# Patient Record
Sex: FEMALE | Race: WHITE | NOT HISPANIC OR LATINO | Employment: PART TIME | URBAN - METROPOLITAN AREA
[De-identification: names, ages, dates, MRNs, and addresses within clinical notes are randomized per-mention and may not be internally consistent; named-entity substitution may affect disease eponyms.]

---

## 2013-10-30 LAB — HM MAMMOGRAM: NORMAL

## 2013-11-05 LAB — HM PAP SMEAR: NORMAL

## 2017-02-02 ENCOUNTER — OFFICE VISIT (OUTPATIENT)
Dept: OBSTETRICS AND GYNECOLOGY | Age: 47
End: 2017-02-02

## 2017-02-02 VITALS
DIASTOLIC BLOOD PRESSURE: 70 MMHG | HEIGHT: 64 IN | WEIGHT: 188 LBS | SYSTOLIC BLOOD PRESSURE: 110 MMHG | BODY MASS INDEX: 32.1 KG/M2

## 2017-02-02 DIAGNOSIS — Z01.419 ENCOUNTER FOR GYNECOLOGICAL EXAMINATION: Primary | ICD-10-CM

## 2017-02-02 DIAGNOSIS — N95.1 PERIMENOPAUSE: ICD-10-CM

## 2017-02-02 DIAGNOSIS — F32.A DEPRESSION, UNSPECIFIED DEPRESSION TYPE: ICD-10-CM

## 2017-02-02 PROCEDURE — 99396 PREV VISIT EST AGE 40-64: CPT | Performed by: NURSE PRACTITIONER

## 2017-02-02 RX ORDER — RISPERIDONE 2 MG/1
TABLET ORAL
Refills: 1 | COMMUNITY
Start: 2017-01-23 | End: 2018-04-30 | Stop reason: DRUGHIGH

## 2017-02-02 RX ORDER — NORELGESTROMIN AND ETHINYL ESTRADIOL 150; 35 UG/D; UG/D
PATCH TRANSDERMAL
Refills: 0 | COMMUNITY
Start: 2017-01-13 | End: 2017-02-02

## 2017-02-02 RX ORDER — BUSPIRONE HYDROCHLORIDE 15 MG/1
TABLET ORAL
Refills: 0 | COMMUNITY
Start: 2017-01-05 | End: 2018-03-05 | Stop reason: SDUPTHER

## 2017-02-02 NOTE — PROGRESS NOTES
"Subjective     Chief Complaint   Patient presents with   • Gynecologic Exam     Pt also wants hormones checked. Last ae .       History of Present Illness    Yessy Mchugh is a 46 y.o. female who presents for annual exam.  Patient here with complaints of increase in her depression symptoms over the past 6 months.  She is seeing a psychiatrist and therapist and they are working on her depression meds but she has now realized that her symptoms are specifically before and during her cycle.  She always has depression and anxiety but it is significantly worse during this time.  She has been seeing her PCP and he started her on xulane which she has used for the past two weeks.  She has not had a cycle since starting it so she isn't sure if it has helped at all.  She is here to follow up and make sure that is what we would suggest.  She has taken birth control in the past and has no new medical changes or complaints.  We discussed her cycles and symptoms in detail as well as s/s of menopause.  Her cycles are very regular and she denies any vasomotor symptoms.  No bowel or bladder problems.  She has not been seen in our office for a few years and has not had a mammogram during that time as well. She has had a tubal.  She currently denies any suicidal ideations but she has had them in the recent past.  She has had a full work up from PCP and has labs done that were \"normal\".    Obstetric History:  OB History      Para Term  AB TAB SAB Ectopic Multiple Living    3 3 3                Menstrual History:     Patient's last menstrual period was 2017.       Sexual History:   + no problems        Her menses are regular every 28-30 days, lasting 4-7 days.      History of abnormal Pap smear: no  Received Gardasil immunization: no  Perform regular self breast exam: yes - occasional  Family history of uterine or ovarian cancer: no  Family History of colon cancer: no  Family history of breast cancer: " no    Mammogram: ordered.  Colonoscopy: not indicated.  DEXA: not indicated.  Last Pap:    Smoking status: Former Smoker                                                              Packs/day: 0.00      Years: 0.00         Smokeless status: Not on file                       Exercise: not active.  Discussed  Calcium/Vitamin D: Discussed adding supplements    The following portions of the patient's history were reviewed and updated as appropriate: allergies, current medications, past family history, past medical history, past social history, past surgical history and problem list.    Review of Systems   Constitutional: Negative.    HENT: Negative.    Eyes: Negative.    Respiratory: Negative.    Cardiovascular: Negative.    Gastrointestinal: Negative.    Endocrine: Negative.    Genitourinary: Negative.    Musculoskeletal: Negative.    Skin: Negative.    Allergic/Immunologic: Negative.    Neurological: Negative.    Hematological: Negative.    Psychiatric/Behavioral: Positive for decreased concentration. Negative for suicidal ideas. The patient is nervous/anxious.            Objective   Physical Exam   Constitutional: She is oriented to person, place, and time. She appears well-developed and well-nourished.   Neck: No thyroid mass present.   Cardiovascular: Normal rate, regular rhythm and normal heart sounds.    Pulmonary/Chest: Effort normal and breath sounds normal. Right breast exhibits no mass, no nipple discharge, no skin change and no tenderness. Left breast exhibits no mass, no nipple discharge, no skin change and no tenderness.   Abdominal: Soft. There is no tenderness.   Genitourinary: Vagina normal and uterus normal. There is no rash or lesion on the right labia. Cervix exhibits no motion tenderness, no discharge and no friability. Right adnexum displays no mass and no tenderness. Left adnexum displays no mass and no tenderness.   Neurological: She is alert and oriented to person, place, and time.  "  Psychiatric: She has a normal mood and affect. Her behavior is normal.   Vitals reviewed.      Visit Vitals   • /70   • Ht 64\" (162.6 cm)   • Wt 188 lb (85.3 kg)   • LMP 01/16/2017   • BMI 32.27 kg/m2       Assessment/Plan   Yessy was seen today for gynecologic exam.    Diagnoses and all orders for this visit:    Encounter for gynecological examination    Perimenopause    Depression, unspecified depression type        Breast self exam technique reviewed and patient encouraged to perform self-exam monthly.  Discussed healthy lifestyle modifications.  Pap smear done today     Discussed stopping xulane.  This would not be my first choice for someone her age.  We discussed starting with a lower dose pill such as Loloestrin.  Discussed and sample given.  She will stop if her depression worsens with it.  Otherwise I would start it with her next cycle and follow up in 3 months.  The use of the oral contraceptive has been fully discussed with the patient. This includes the proper method to initiate (i.e. Sunday start after next normal menstrual onset) and continue the pills, the need for regular compliance to ensure adequate contraceptive effect, the physiology which make the pill effective, the instructions for what to do in event of a missed pill, and warnings about anticipated minor side effects such as breakthrough spotting, nausea, breast tenderness, weight changes, acne, headaches, etc.  She has been told of the more serious potential side effects such as MI, stroke, and deep vein thrombosis, all of which are very unlikely.  She has been asked to report any signs of such serious problems immediately.  She should back up the pill with a condom during any cycle in which antibiotics are prescribed, and during the first cycle as well. The need for additional protection, such as a condom, to prevent exposure to sexually transmitted diseases has also been discussed- the patient has been clearly reminded that OCP's " cannot protect her against diseases such as HIV and others. She understands and wishes to take the medication as prescribed.

## 2017-02-06 DIAGNOSIS — R92.1 BREAST CALCIFICATION SEEN ON MAMMOGRAM: Primary | ICD-10-CM

## 2017-02-07 DIAGNOSIS — R92.8 ABNORMAL MAMMOGRAM: Primary | ICD-10-CM

## 2017-02-07 LAB
CYTOLOGIST CVX/VAG CYTO: NORMAL
CYTOLOGY CVX/VAG DOC THIN PREP: NORMAL
DX ICD CODE: NORMAL
HIV 1 & 2 AB SER-IMP: NORMAL
HPV I/H RISK 4 DNA CVX QL PROBE+SIG AMP: NEGATIVE
Lab: NORMAL
OTHER STN SPEC: NORMAL
PATH REPORT.FINAL DX SPEC: NORMAL
STAT OF ADQ CVX/VAG CYTO-IMP: NORMAL

## 2017-02-08 ENCOUNTER — TELEPHONE (OUTPATIENT)
Dept: OBSTETRICS AND GYNECOLOGY | Age: 47
End: 2017-02-08

## 2017-02-08 NOTE — TELEPHONE ENCOUNTER
----- Message from SAHIL Burroughs sent at 2/7/2017  4:44 PM EST -----  Notify pap and hpv are negative

## 2017-02-14 ENCOUNTER — HOSPITAL ENCOUNTER (OUTPATIENT)
Dept: MAMMOGRAPHY | Facility: HOSPITAL | Age: 47
Discharge: HOME | End: 2017-02-14
Attending: OBSTETRICS & GYNECOLOGY | Admitting: OBSTETRICS & GYNECOLOGY

## 2017-02-14 PROCEDURE — G0206 DX MAMMO INCL CAD UNI: HCPCS

## 2017-02-18 DIAGNOSIS — R92.1 BREAST CALCIFICATION SEEN ON MAMMOGRAM: Primary | ICD-10-CM

## 2017-02-28 NOTE — PROGRESS NOTES
I called the patient and notified her of these results. She had already spoken to the radiologist about them that day and scheduled a follow up mg in august.Show this to Mady.

## 2017-05-03 ENCOUNTER — OFFICE VISIT (OUTPATIENT)
Dept: OBSTETRICS AND GYNECOLOGY | Age: 47
End: 2017-05-03

## 2017-05-03 VITALS
SYSTOLIC BLOOD PRESSURE: 100 MMHG | WEIGHT: 179 LBS | HEIGHT: 64 IN | BODY MASS INDEX: 30.56 KG/M2 | DIASTOLIC BLOOD PRESSURE: 60 MMHG

## 2017-05-03 DIAGNOSIS — N95.1 PERIMENOPAUSE: Primary | ICD-10-CM

## 2017-05-03 DIAGNOSIS — F32.A DEPRESSION, UNSPECIFIED DEPRESSION TYPE: ICD-10-CM

## 2017-05-03 PROCEDURE — 99213 OFFICE O/P EST LOW 20 MIN: CPT | Performed by: OBSTETRICS & GYNECOLOGY

## 2017-05-03 RX ORDER — NORETHINDRONE ACETATE AND ETHINYL ESTRADIOL 1; .02 MG/1; MG/1
1 TABLET ORAL DAILY
Qty: 21 TABLET | Refills: 18 | Status: SHIPPED | OUTPATIENT
Start: 2017-05-03 | End: 2018-03-05 | Stop reason: SDUPTHER

## 2017-05-09 LAB — FSH SERPL-ACNC: 1.1 MIU/ML

## 2017-05-11 ENCOUNTER — TELEPHONE (OUTPATIENT)
Dept: OBSTETRICS AND GYNECOLOGY | Age: 47
End: 2017-05-11

## 2017-07-14 NOTE — PROGRESS NOTES
" GYN PROBLEM EXAM                                    2017    Subjective   Yessy Mchugh is a 47 y.o. White Female,   (this is a LATE NOTE COMPLETION).    Chief complaint:  Follow-up (f/u from starting birth control pills)    History of Present Illness   Mis saw SAHIL Krishnamurthy, for an annual exam on 2017 and complained of increasing depression symptoms before and during her cycles.  She is seeing her psychiatrist and they are adjusting her depression medicines.  Radha and Mis discussed starting on birth control pills to see if that would help with the cyclic depressive symptoms.  They started with Lo-Loestrin and she has noticed some improvement, however they are expensive.       Also she is currently \"on her period\" but is not having any bleeding, and has not had any bleeding during her 'cycle\" for the last couple of months  She also was not having any hot flashes or night sweats.  Since she is 47 years of age, we discussed checking an FSH at the end of her menses, before starting her new birth control pills.  I also suggested converting to Loestrin instead of Lo-Lo, as it would be less expensive, but also discussed taking them continuously to truly suppress cycles, to see if this would be any better for her cyclic increase in depression symptoms.    The following portions of the patient's history were reviewed / updated as appropriate:   allergies, current medications,  past medical history, past surgical history, past family history, past social history, and problem list.    Review of Systems   Psychiatric/Behavioral:        Irritability, depression, mood swings.   All other systems reviewed and are negative.      /60  Ht 64\" (162.6 cm)  Wt 179 lb (81.2 kg)  BMI 30.73 kg/m2    Objective   OBGyn Exam     PHYSICAL EXAM     I didn't do a physical exam.       Well-developed, well-nourished, overweight white female, in no distress, alert and well oriented ×3.       " Mood and affect is normal.  Behavior normal.  Thought content normal.            Judgment normal.         Assessment/Plan   Yessy was seen today for follow-up.    Diagnoses and all orders for this visit:    Perimenopause  -     Follicle Stimulating Hormone    Depression, unspecified depression type  -     norethindrone-ethinyl estradiol (MICROGESTIN 1/20) 1-20 MG-MCG per tablet; Take 1 tablet by mouth Daily. Continuously, no break in the active pills.    For billing purposes, the consultation took about 20 minutes.    Avel Villarreal MD  7/14/2017

## 2017-08-17 ENCOUNTER — HOSPITAL ENCOUNTER (OUTPATIENT)
Dept: MAMMOGRAPHY | Facility: HOSPITAL | Age: 47
Discharge: HOME | End: 2017-08-17
Attending: OBSTETRICS & GYNECOLOGY | Admitting: OBSTETRICS & GYNECOLOGY

## 2017-08-17 DIAGNOSIS — R92.1 BREAST CALCIFICATION SEEN ON MAMMOGRAM: ICD-10-CM

## 2017-08-17 PROCEDURE — G0204 DX MAMMO INCL CAD BI: HCPCS

## 2017-08-21 ENCOUNTER — TELEPHONE (OUTPATIENT)
Dept: MAMMOGRAPHY | Age: 47
End: 2017-08-21

## 2017-08-21 NOTE — TELEPHONE ENCOUNTER
Informed pt her follow up mammogram indicates she does a  6 month mammogram of both breast.  Order in EPIC.

## 2018-02-20 ENCOUNTER — HOSPITAL ENCOUNTER (OUTPATIENT)
Dept: MAMMOGRAPHY | Facility: HOSPITAL | Age: 48
Discharge: HOME | End: 2018-02-20
Attending: OBSTETRICS & GYNECOLOGY | Admitting: OBSTETRICS & GYNECOLOGY

## 2018-02-20 DIAGNOSIS — R92.1 BREAST CALCIFICATION SEEN ON MAMMOGRAM: ICD-10-CM

## 2018-02-20 PROCEDURE — 77066 DX MAMMO INCL CAD BI: CPT

## 2018-03-04 DIAGNOSIS — R92.1 BREAST CALCIFICATIONS ON MAMMOGRAM: Primary | ICD-10-CM

## 2018-03-05 ENCOUNTER — TELEPHONE (OUTPATIENT)
Dept: OBSTETRICS AND GYNECOLOGY | Age: 48
End: 2018-03-05

## 2018-03-05 PROBLEM — F90.9 ADHD (ATTENTION DEFICIT HYPERACTIVITY DISORDER): Status: ACTIVE | Noted: 2018-03-05

## 2018-03-05 PROBLEM — F32.A DEPRESSION: Status: ACTIVE | Noted: 2018-03-05

## 2018-03-05 RX ORDER — BUSPIRONE HYDROCHLORIDE 15 MG/1
15 TABLET ORAL
COMMUNITY
End: 2018-04-30

## 2018-03-05 RX ORDER — RISPERIDONE 1 MG/1
1 TABLET ORAL
COMMUNITY
End: 2018-04-30 | Stop reason: SDUPTHER

## 2018-03-05 RX ORDER — RISPERIDONE 3 MG/1
TABLET ORAL
Refills: 3 | COMMUNITY
Start: 2017-12-18

## 2018-03-09 ENCOUNTER — TELEPHONE (OUTPATIENT)
Dept: OBSTETRICS AND GYNECOLOGY | Age: 48
End: 2018-03-09

## 2018-04-30 ENCOUNTER — OFFICE VISIT (OUTPATIENT)
Dept: OBSTETRICS AND GYNECOLOGY | Age: 48
End: 2018-04-30

## 2018-04-30 VITALS
HEIGHT: 64 IN | DIASTOLIC BLOOD PRESSURE: 68 MMHG | SYSTOLIC BLOOD PRESSURE: 108 MMHG | WEIGHT: 183 LBS | BODY MASS INDEX: 31.24 KG/M2

## 2018-04-30 DIAGNOSIS — Z01.419 WELL WOMAN EXAM WITH ROUTINE GYNECOLOGICAL EXAM: Primary | ICD-10-CM

## 2018-04-30 DIAGNOSIS — Z30.41 ENCOUNTER FOR SURVEILLANCE OF CONTRACEPTIVE PILLS: ICD-10-CM

## 2018-04-30 LAB
BILIRUB BLD-MCNC: NEGATIVE MG/DL
CLARITY, POC: CLEAR
COLOR UR: YELLOW
GLUCOSE UR STRIP-MCNC: NEGATIVE MG/DL
KETONES UR QL: NEGATIVE
LEUKOCYTE EST, POC: NEGATIVE
NITRITE UR-MCNC: NEGATIVE MG/ML
PH UR: 7 [PH] (ref 5–8)
PROT UR STRIP-MCNC: NEGATIVE MG/DL
RBC # UR STRIP: NEGATIVE /UL
SP GR UR: 1.02 (ref 1–1.03)
UROBILINOGEN UR QL: NORMAL

## 2018-04-30 PROCEDURE — 81003 URINALYSIS AUTO W/O SCOPE: CPT | Performed by: OBSTETRICS & GYNECOLOGY

## 2018-04-30 PROCEDURE — 99396 PREV VISIT EST AGE 40-64: CPT | Performed by: OBSTETRICS & GYNECOLOGY

## 2018-04-30 RX ORDER — DULOXETIN HYDROCHLORIDE 20 MG/1
CAPSULE, DELAYED RELEASE ORAL
COMMUNITY
Start: 2018-04-14 | End: 2021-08-16

## 2018-08-20 ENCOUNTER — HOSPITAL ENCOUNTER (OUTPATIENT)
Dept: MAMMOGRAPHY | Facility: HOSPITAL | Age: 48
Discharge: HOME | End: 2018-08-20
Attending: OBSTETRICS & GYNECOLOGY | Admitting: OBSTETRICS & GYNECOLOGY

## 2018-08-20 DIAGNOSIS — R92.1 BREAST CALCIFICATIONS ON MAMMOGRAM: ICD-10-CM

## 2018-08-20 PROCEDURE — 77066 DX MAMMO INCL CAD BI: CPT

## 2018-08-21 NOTE — PROGRESS NOTES
Notify Mis that her 6 month bilateral breast mammograms are normal.  The calcifications in both breasts are stable.  She has no new or dominant masses, no areas of architectural distortion or skin thickening.  These are normal mammograms.  We'll repeat in 1 year.    Normal Mammogram.  Breasts with scattered fibroglandular densities and stable calcifications bilaterally.  BI-RADS 2. Repeat yearly.

## 2019-02-11 DIAGNOSIS — Z30.41 ENCOUNTER FOR SURVEILLANCE OF CONTRACEPTIVE PILLS: ICD-10-CM

## 2019-02-11 NOTE — TELEPHONE ENCOUNTER
Dr Villarreal Pt (aware Dr Villarreal is out today) is requesting her b/c refill please be sent to her updated [facility] pharmacy on file.

## 2019-05-07 ENCOUNTER — OFFICE VISIT (OUTPATIENT)
Dept: OBSTETRICS AND GYNECOLOGY | Age: 49
End: 2019-05-07

## 2019-05-07 VITALS
SYSTOLIC BLOOD PRESSURE: 110 MMHG | BODY MASS INDEX: 31.82 KG/M2 | WEIGHT: 191 LBS | DIASTOLIC BLOOD PRESSURE: 72 MMHG | HEIGHT: 65 IN

## 2019-05-07 DIAGNOSIS — R92.1 BREAST CALCIFICATIONS ON MAMMOGRAM: ICD-10-CM

## 2019-05-07 DIAGNOSIS — Z12.11 ENCOUNTER FOR SCREENING COLONOSCOPY: ICD-10-CM

## 2019-05-07 DIAGNOSIS — Z13.89 SCREENING FOR BLOOD OR PROTEIN IN URINE: ICD-10-CM

## 2019-05-07 DIAGNOSIS — N95.1 PERIMENOPAUSE: ICD-10-CM

## 2019-05-07 DIAGNOSIS — Z01.419 WELL WOMAN EXAM WITH ROUTINE GYNECOLOGICAL EXAM: Primary | ICD-10-CM

## 2019-05-07 DIAGNOSIS — Z12.31 VISIT FOR SCREENING MAMMOGRAM: ICD-10-CM

## 2019-05-07 PROBLEM — F31.9 BIPOLAR 1 DISORDER: Status: ACTIVE | Noted: 2017-01-01

## 2019-05-07 LAB
BILIRUB BLD-MCNC: NEGATIVE MG/DL
GLUCOSE UR STRIP-MCNC: NEGATIVE MG/DL
KETONES UR QL: NEGATIVE
LEUKOCYTE EST, POC: NEGATIVE
NITRITE UR-MCNC: NEGATIVE MG/ML
PH UR: 6 [PH] (ref 5–8)
PROT UR STRIP-MCNC: NEGATIVE MG/DL
RBC # UR STRIP: NEGATIVE /UL
SP GR UR: 1.02 (ref 1–1.03)
UROBILINOGEN UR QL: NORMAL

## 2019-05-07 PROCEDURE — 99396 PREV VISIT EST AGE 40-64: CPT | Performed by: OBSTETRICS & GYNECOLOGY

## 2019-05-07 PROCEDURE — 81002 URINALYSIS NONAUTO W/O SCOPE: CPT | Performed by: OBSTETRICS & GYNECOLOGY

## 2019-05-07 NOTE — PROGRESS NOTES
Routine Annual Visit    2019    Patient: Yessy Mchugh          MR#:[id number]    Chief Complaint   Patient presents with   • Annual Exam     PT HERE FOR ROUTINE AE, HAD MG DONE IN OCT OF LAST YEAR. LAST PAP  NEG AND NEG HPV.        49 y.o. female  who presents for annual exam.     HISTORY OF PRESENT ILLNESS:    GYN Complaints: None.    Other Complaints: None.    GYN HISTORY:    1.  Menstrual Hx: Continuous BCP with no cycles.  Using birth control pills for cycle control, since her bipolar disorder and ADD is much worse at the time of her cycles.  Takes the pills continuously so that she does not have any periods.              Current contraception: Tubal ligation at the time of last .    2.  History of abnormal Pap smear: no.         Pap smear Hx:  No past history of abnormal Pap smears.  , , Neg Paps,  Neg HR-HPVs. .    PAST MEDICAL HISTORY:       has a current medication list which includes the following prescription(s): duloxetine, norethin-eth estrad-fe biphas, and risperidone.    3.  New Medical Hx:  None.        Past Medical History:   Diagnosis Date   • ADHD (attention deficit hyperactivity disorder) 3/5/2018   • Bipolar 1 disorder (CMS/[facility]) 2017    Diagnosed around Feb-2017.  Depression symptoms are worse during her menses.   • Depression    • Ectopic pregnancy    • Gestational diabetes    • History of Papanicolaou smear of cervix [id number]    No past history of abnormal Pap smears.  , , Neg Paps,  Neg HR-HPVs.   •  (vaginal birth after )        4.  New Surgical Hx:  None.        Past Surgical History:   Procedure Laterality Date   •  SECTION   &    • DENTAL PROCEDURE Bilateral 1985    All 4 [place] teeth.   • POSTPARTUM TUBAL LIGATION         5.  New Family Medical Hx:  Father with Pulm Embolism.  Her paternal grandmother has a history of DVTs.  I have asked Mis to check and see if her father had a thrombophilia profile  "drawn, and if so to find out what where the positive findings.       Family History   Problem Relation Age of Onset   • Diabetes Mother         Type 2- Med controlled.   • Other Mother         Total Hysterectomy.   • Cancer Maternal Grandmother         Lung   • Alcohol abuse Maternal Grandmother    • Colon cancer Paternal Grandmother    • Deep vein thrombosis Paternal Grandmother    • Pulmonary embolism Father    • No Known Problems Sister    • No Known Problems Daughter    • No Known Problems Son    • Multiple sclerosis Paternal Aunt    • No Known Problems Son    • Lung cancer Maternal Uncle    • Parkinsonism Paternal Uncle    • BRCA 1/2 Neg Hx    • Breast cancer Neg Hx    • Endometrial cancer Neg Hx    • Ovarian cancer Neg Hx        6.  Social History     Tobacco Use   Smoking Status Former Smoker   • Last attempt to quit:    • Years since quittin.3   Smokeless Tobacco Never Used         Review of Systems   HENT: Positive for ear pain. Facial swelling:  Saw ENT.  Earaches secondary to TMJ disorder.  Now wearing a mouth brace to sleep.    Eyes: Positive for visual disturbance ( Now wearing glasses.).   All other systems reviewed and are negative.        SCREENINGS:  =Family history of breast cancer: no  =Family history of ovarian cancer: no  =Family history of uterine cancer: no  =Family History of colon cancer: yes - PGM    Perform regular self breast exam: yes - Monthly      Mammogram: up to date.  Colonoscopy: recommended.  DEXA: not indicated.      LIFESTYLE:  =Diet: Healthy.     =Exercise:  yes - Sometimes, walking 2-3 / week..   =I recommended 30 minutes of aerobic exercise 5 times a week.     =Calcium:  no.  =Vitamin D:  no.   = We discussed calcium intake needs to help prevent osteoporosis:      Recommended 600 mg of calcium daily and 1000 IUs of vitamin D 3 daily.        Objective     /72   Ht 165.1 cm (65\")   Wt 86.6 kg (191 lb)   LMP  (LMP Unknown)   BMI 31.78 kg/m²     PHYSICAL " EXAM    OBGyn Exam    Constitutional: Well-developed, well-nourished, slightly obese  female, in no distress, alert and well oriented ×3.    Skin is warm, dry, without rash.       Neck: Normal, trachea in the midline.  Thyroid exam normal. No carotid bruits bilaterally. No cervical lymphadenopathy.    Back: Normal.  No CVA tenderness.    Lungs: Clear to auscultation.  Chest wall appears normal.    Heart:: Regular Rhythm without murmur or gallop.    Breasts: Regular self breast exams encouraged.        Right breast nontender, without dominant mass.  No nipple discharge.            No superficial skin changes.  No axillary adenopathy.        Left breast nontender, without dominant mass.  No nipple discharge.            No superficial skin changes.  No axillary adenopathy.      Abdomen: Flat, soft and nontender.No palpable mass.           No hepatosplenomegaly.  Flanks are negative.          Bowel sounds normal.  No abdominal bruit.          No inguinal lymphadenopathy bilaterally.     Pelvic exam :  Vulva normal.           External genitalia normal.  BUS negative.             Introitus normal.  Vaginal mucosa normal.  Well estrogenized.  Normal discharge.          Cervix normal, very small external office.  No Pap smear.  No cervical motion tenderness.          Uterus midplane to slightly retroverted, normal size, normal shape, and position.  Negative not tender.          Adnexa are negative bilaterally.  No tenderness.  No palpable mass.          Rectovaginal exam negative.    Musc /Skel: Lower extremities without edema.     Neuro: Coordination is grossly normal.  Gait is normal.    Psych: Mood and affect is normal.  Behavior normal.  Thought content normal.            Judgment normal.       =All questions were answered.      Assessment/Plan   Yessy was seen today for annual exam.    Diagnoses and all orders for this visit:    Well woman exam with routine gynecological exam  -     IGP, Apt HPV,rfx 16 /  "18,45    Perimenopause  -     Follicle Stimulating Hormone; Future  -     IGP, Apt HPV,rfx 16 / 18,45    Encounter for screening colonoscopy  -     Ambulatory Referral For Screening Colonoscopy    Breast calcifications on mammogram  -     Mammo Screening Digital Tomosynthesis Bilateral With CAD; Future    Visit for screening mammogram  -     Mammo Screening Digital Tomosynthesis Bilateral With CAD; Future    Screening for blood or protein in urine  -     POC Urinalysis Dipstick      COMMENTS: Normal annual exam.  No Pap smear today.  Mammogram due August 2018.     Her mother had a large mass removed from her \"intestine\", but it sounds like it was seen at colonoscopy, I wonder if it was in the terminal ileum.  I asked her to try and find out the specifics.     Mis's father had a pulmonary embolism this year and her paternal grandmother has a history of DVT in her legs.  I have asked Mis to find out if her father had a thrombophilia profile done.  If it is positive for any of the thrombophilia disorders, Mis will need testing.     I have asked Mis to stop her birth control pills and then we will check an FSH next Wednesday at which time she can resume them.  However if her FSH is significantly elevated we should have her stay off for a full month and repeat the test.  She is not on birth control pills for birth control but to suppress her cycles because of her worsening bipolar disorder at the time of her cycles.    Avel Villarreal MD  5/8/2019      "

## 2019-05-11 LAB
CYTOLOGIST CVX/VAG CYTO: NORMAL
CYTOLOGY CVX/VAG DOC CYTO: NORMAL
CYTOLOGY CVX/VAG DOC THIN PREP: NORMAL
DX ICD CODE: NORMAL
HIV 1 & 2 AB SER-IMP: NORMAL
HPV I/H RISK 4 DNA CVX QL PROBE+SIG AMP: NEGATIVE
OTHER STN SPEC: NORMAL
STAT OF ADQ CVX/VAG CYTO-IMP: NORMAL

## 2019-05-14 ENCOUNTER — RESULTS ENCOUNTER (OUTPATIENT)
Dept: OBSTETRICS AND GYNECOLOGY | Age: 49
End: 2019-05-14

## 2019-05-14 DIAGNOSIS — N95.1 PERIMENOPAUSE: ICD-10-CM

## 2019-05-16 LAB — FSH SERPL-ACNC: 3.7 MIU/ML

## 2019-05-23 ENCOUNTER — TELEPHONE (OUTPATIENT)
Dept: OBSTETRICS AND GYNECOLOGY | Age: 49
End: 2019-05-23

## 2019-06-10 ENCOUNTER — AMBULATORY SURGICAL CENTER (OUTPATIENT)
Dept: URBAN - METROPOLITAN AREA SURGERY 20 | Facility: SURGERY | Age: 49
End: 2019-06-10
Payer: COMMERCIAL

## 2019-06-10 ENCOUNTER — OFFICE (OUTPATIENT)
Dept: URBAN - METROPOLITAN AREA PATHOLOGY 4 | Facility: PATHOLOGY | Age: 49
End: 2019-06-10
Payer: COMMERCIAL

## 2019-06-10 DIAGNOSIS — D12.8 BENIGN NEOPLASM OF RECTUM: ICD-10-CM

## 2019-06-10 DIAGNOSIS — Z12.11 ENCOUNTER FOR SCREENING FOR MALIGNANT NEOPLASM OF COLON: ICD-10-CM

## 2019-06-10 DIAGNOSIS — D12.5 BENIGN NEOPLASM OF SIGMOID COLON: ICD-10-CM

## 2019-06-10 PROBLEM — K62.1 RECTAL POLYP: Status: ACTIVE | Noted: 2019-06-10

## 2019-06-10 LAB
GI HISTOLOGY: A. SELECT: (no result)
GI HISTOLOGY: B. SELECT: (no result)
GI HISTOLOGY: PDF REPORT: (no result)

## 2019-06-10 PROCEDURE — 88305 TISSUE EXAM BY PATHOLOGIST: CPT | Mod: 33 | Performed by: INTERNAL MEDICINE

## 2019-06-10 PROCEDURE — 45385 COLONOSCOPY W/LESION REMOVAL: CPT | Mod: 33 | Performed by: INTERNAL MEDICINE

## 2019-07-23 DIAGNOSIS — Z30.41 ENCOUNTER FOR SURVEILLANCE OF CONTRACEPTIVE PILLS: ICD-10-CM

## 2019-07-23 RX ORDER — NORETHINDRONE ACETATE AND ETHINYL ESTRADIOL, ETHINYL ESTRADIOL AND FERROUS FUMARATE 1MG-10(24)
KIT ORAL
Qty: 84 TABLET | Refills: 1 | Status: SHIPPED | OUTPATIENT
Start: 2019-07-23 | End: 2019-12-11 | Stop reason: SDUPTHER

## 2019-08-23 ENCOUNTER — HOSPITAL ENCOUNTER (OUTPATIENT)
Dept: MAMMOGRAPHY | Facility: HOSPITAL | Age: 49
Discharge: HOME | End: 2019-08-23
Admitting: OBSTETRICS & GYNECOLOGY

## 2019-08-23 DIAGNOSIS — R92.1 BREAST CALCIFICATIONS ON MAMMOGRAM: ICD-10-CM

## 2019-08-23 DIAGNOSIS — Z12.31 VISIT FOR SCREENING MAMMOGRAM: ICD-10-CM

## 2019-08-23 PROCEDURE — 77063 BREAST TOMOSYNTHESIS BI: CPT

## 2019-08-23 PROCEDURE — 77067 SCR MAMMO BI INCL CAD: CPT

## 2019-12-11 DIAGNOSIS — Z30.41 ENCOUNTER FOR SURVEILLANCE OF CONTRACEPTIVE PILLS: ICD-10-CM

## 2019-12-11 RX ORDER — NORETHINDRONE ACETATE AND ETHINYL ESTRADIOL, ETHINYL ESTRADIOL AND FERROUS FUMARATE 1MG-10(24)
KIT ORAL
Qty: 84 TABLET | Refills: 0 | Status: SHIPPED | OUTPATIENT
Start: 2019-12-11 | End: 2020-02-10 | Stop reason: SDUPTHER

## 2020-02-10 DIAGNOSIS — Z30.41 ENCOUNTER FOR SURVEILLANCE OF CONTRACEPTIVE PILLS: ICD-10-CM

## 2020-02-10 NOTE — TELEPHONE ENCOUNTER
Requesting refil on lo loestrin 1/10   90 day supply    [facility]    Last annual with juni 05/07/2019

## 2020-07-08 ENCOUNTER — OFFICE VISIT (OUTPATIENT)
Dept: OBSTETRICS AND GYNECOLOGY | Age: 50
End: 2020-07-08

## 2020-07-08 VITALS
DIASTOLIC BLOOD PRESSURE: 72 MMHG | WEIGHT: 197.4 LBS | SYSTOLIC BLOOD PRESSURE: 122 MMHG | BODY MASS INDEX: 32.89 KG/M2 | HEIGHT: 65 IN

## 2020-07-08 DIAGNOSIS — Z12.39 BREAST CANCER SCREENING: Primary | ICD-10-CM

## 2020-07-08 DIAGNOSIS — N95.1 PERIMENOPAUSE: ICD-10-CM

## 2020-07-08 PROCEDURE — 99396 PREV VISIT EST AGE 40-64: CPT | Performed by: OBSTETRICS & GYNECOLOGY

## 2020-07-08 RX ORDER — OXYBUTYNIN CHLORIDE 5 MG/1
5 TABLET, EXTENDED RELEASE ORAL DAILY
Qty: 30 TABLET | Refills: 11 | Status: SHIPPED | OUTPATIENT
Start: 2020-07-08 | End: 2020-08-03

## 2020-07-08 NOTE — PROGRESS NOTES
Routine Annual Visit    2020    Patient: Yessy Mchugh          MR#:[id number]      Chief Complaint   Patient presents with   • Gynecologic Exam     New pt. former dr alfredo. last pap 19(-) MG 19 colonoscopy 6/10/19 no complaints today        History of Present Illness    50 y.o. female  who presents for annual exam.   Doesn't think she is yet menopausal and is kind of concerned about this since 50 now. Stopped OCPs and had bleeding in  but none further. No hot flashes    Has urinary urgency and leakage on way to bathroom. Also is up at night to void.     Not exercising, has gained weight during covid but is motivated to lose weight and has been eating more healthy now that back at work.    Her father had a PE and Dr. Alfredo had asked her to find out if had hereditary, they do not believe it is    Health Maintenance  Last pap: 2019, history abnormal: none  Mammogram: 2019, history abnormal: no  Colonoscopy , repeat due 3 yrs due to polyps  Family history of Breast, ovarian, uterine, colon, pancreatic cancer: yes - PGM  PCP needs a new one, going to pick from same practice    Current contraception: none    Patient's last menstrual period was 2020 (approximate).  Obstetric History:  OB History        4    Para   3    Term   3            AB   1    Living   3       SAB        TAB        Ectopic   1    Molar        Multiple        Live Births   3               Menstrual History:     Patient's last menstrual period was 2020 (approximate).       ________________________________________  Patient Active Problem List   Diagnosis   • ADHD (attention deficit hyperactivity disorder)   • Depression   • Bipolar 1 disorder (CMS/HCC)       Past Medical History:   Diagnosis Date   • ADHD (attention deficit hyperactivity disorder) 3/5/2018   • Bipolar 1 disorder (CMS/HCC) 2017    Diagnosed around Feb-2017.  Depression symptoms are worse during her menses.   • Colon  polyps 2019    Tubular adenoma in the sigmoid colon and traditional serrated adenoma without high-grade dysplasia or malignancy in the rectum.   • Depression    • Ectopic pregnancy    • Gestational diabetes    • History of Papanicolaou smear of cervix [id number]    No past history of abnormal Pap smears.  , , Neg Paps,  Neg HR-HPVs.   •  (vaginal birth after )        Family History   Problem Relation Age of Onset   • Diabetes Mother         Type 2- Med controlled.   • Other Mother         Total Hysterectomy.   • Cancer Maternal Grandmother         Lung   • Alcohol abuse Maternal Grandmother    • Colon cancer Paternal Grandmother    • Deep vein thrombosis Paternal Grandmother    • Pulmonary embolism Father    • No Known Problems Sister    • No Known Problems Daughter    • No Known Problems Son    • Multiple sclerosis Paternal Aunt    • No Known Problems Son    • Lung cancer Maternal Uncle    • Parkinsonism Paternal Uncle    • BRCA 1/2 Neg Hx    • Breast cancer Neg Hx    • Endometrial cancer Neg Hx    • Ovarian cancer Neg Hx        Past Surgical History:   Procedure Laterality Date   •  SECTION   &    • COLONOSCOPY W/ BIOPSIES N/A 2019    Tubular adenoma removed from the sigmoid colon and a traditional serrated adenoma without high-grade dysplasia or cancer removed from the rectum.   • DENTAL PROCEDURE Bilateral     All 4 [place] teeth.   • POSTPARTUM TUBAL LIGATION         Social History     Tobacco Use   Smoking Status Former Smoker   • Last attempt to quit:    • Years since quittin.5   Smokeless Tobacco Never Used       has a current medication list which includes the following prescription(s): duloxetine, risperidone, and norethin-eth estrad-fe biphas.  ________________________________________      The following portions of the patient's history were reviewed and updated as appropriate: allergies, current medications, past family history, past medical  "history, past social history, past surgical history and problem list.    Review of Systems   Constitutional: Negative for fever and unexpected weight change.   Respiratory: Negative for shortness of breath.    Cardiovascular: Negative for chest pain.   Gastrointestinal: Negative for abdominal pain, constipation and diarrhea.   Genitourinary: Positive for urgency. Negative for frequency.   Hematological: Negative for adenopathy.   Psychiatric/Behavioral: Negative for dysphoric mood.       Objective   Physical Exam    /72   Ht 165.1 cm (65\")   Wt 89.5 kg (197 lb 6.4 oz)   LMP 06/08/2020 (Approximate)   Breastfeeding No   BMI 32.85 kg/m²    BP Readings from Last 3 Encounters:   07/08/20 122/72   05/07/19 110/72   04/30/18 108/68      Wt Readings from Last 3 Encounters:   07/08/20 89.5 kg (197 lb 6.4 oz)   05/07/19 86.6 kg (191 lb)   04/30/18 83 kg (183 lb)         BMI: Body mass index is 32.85 kg/m².       General:   alert, appears stated age and cooperative   Neck: No thyromegaly or LAD, no carotid bruit noted   Heart:: regular rate and rhythm, S1, S2 normal, no murmur, click, rub or gallop   Lungs: normal respiratory effort and auscultation   Abdomen: soft, non-tender, without masses or organomegaly   Breast: inspection negative, no nipple discharge or bleeding, no masses or nodularity palpable   Urethra and bladder: urethral meatus normal; bladder nontender to palpation;   Vulva: normal, Bartholin's, Urethra, Livingston Wheeler's normal, multiple benign appearing skin tags   Vagina: normal mucosa, normal discharge   Cervix: multiparous appearance and no lesions   Uterus: normal size or anteverted   Adnexa: normal adnexa and no mass, fullness, tenderness       Assessment:    normal annual exam   Need for MG  Perimenopause  OAB    Plan:    Plan     [x]  Mammogram request made  []  PAP done  []  Labs:   []  GC/Chl/TV  []  DEXA scan   []  Referral for colonoscopy:     OAB- oxybutynin to pharmacy " today    Counseling  Discussed s/sx menopause  [x]  Nutrition  [x]  Physical activity/regular exercise   [x]  Healthy weight  []  Injury prevention  []  Smoking cessation  []  Substance misuse/abuse  [x]  Sexual behavior  []  STD prevention  []  Contraception  []  Dental health  []  Mental health  []  Immunization  [x]  Encouraged SBE      Patient's BMI is Body mass index is 32.85 kg/m²., which is classified as obese. We discussed health consequences of obesity and recommended weight loss. I counseled regarding diet modifications and exercise in order to reach weight loss goal.     Clemencia Horan MD  07/08/2020  15:11

## 2020-07-09 LAB
ESTRADIOL SERPL-MCNC: 18.3 PG/ML
FSH SERPL-ACNC: 10.3 MIU/ML

## 2020-07-12 DIAGNOSIS — Z30.41 ENCOUNTER FOR SURVEILLANCE OF CONTRACEPTIVE PILLS: ICD-10-CM

## 2020-07-12 NOTE — PROGRESS NOTES
Called to notify her that her labs are still in the premenopausal range.  If the lo Loestrin has been helping with her periods, would recommend going back on it.  She desires to do so and prescription was sent to the pharmacy.    Clemencia Horan MD  7/12/2020  12:50

## 2020-08-03 ENCOUNTER — TELEPHONE (OUTPATIENT)
Dept: OBSTETRICS AND GYNECOLOGY | Age: 50
End: 2020-08-03

## 2020-08-03 RX ORDER — OXYBUTYNIN CHLORIDE 10 MG/1
10 TABLET, EXTENDED RELEASE ORAL DAILY
Qty: 90 TABLET | Refills: 3 | Status: SHIPPED | OUTPATIENT
Start: 2020-08-03 | End: 2021-08-16

## 2020-08-03 NOTE — TELEPHONE ENCOUNTER
Pt called, wanted to know if you could increase the dosage on the oxybutynin XL (Ditropan-XL) 5 MG 24 hr tablet.      Pharmacy verified.    [phone]

## 2020-08-24 ENCOUNTER — HOSPITAL ENCOUNTER (OUTPATIENT)
Dept: MAMMOGRAPHY | Facility: HOSPITAL | Age: 50
Discharge: HOME | End: 2020-08-24
Admitting: OBSTETRICS & GYNECOLOGY

## 2020-08-24 DIAGNOSIS — Z12.39 BREAST CANCER SCREENING: ICD-10-CM

## 2020-08-24 PROCEDURE — 77067 SCR MAMMO BI INCL CAD: CPT

## 2020-08-24 PROCEDURE — 77063 BREAST TOMOSYNTHESIS BI: CPT

## 2020-08-25 ENCOUNTER — TELEPHONE (OUTPATIENT)
Dept: OBSTETRICS AND GYNECOLOGY | Age: 50
End: 2020-08-25

## 2021-07-06 ENCOUNTER — TRANSCRIBE ORDERS (OUTPATIENT)
Dept: ADMINISTRATIVE | Facility: HOSPITAL | Age: 51
End: 2021-07-06

## 2021-07-06 DIAGNOSIS — Z12.31 ENCOUNTER FOR SCREENING MAMMOGRAM FOR MALIGNANT NEOPLASM OF BREAST: Primary | ICD-10-CM

## 2021-08-16 ENCOUNTER — OFFICE VISIT (OUTPATIENT)
Dept: OBSTETRICS AND GYNECOLOGY | Age: 51
End: 2021-08-16

## 2021-08-16 VITALS
HEIGHT: 65 IN | WEIGHT: 185 LBS | DIASTOLIC BLOOD PRESSURE: 74 MMHG | SYSTOLIC BLOOD PRESSURE: 128 MMHG | BODY MASS INDEX: 30.82 KG/M2

## 2021-08-16 DIAGNOSIS — Z01.419 ENCOUNTER FOR GYNECOLOGICAL EXAMINATION WITHOUT ABNORMAL FINDING: Primary | ICD-10-CM

## 2021-08-16 DIAGNOSIS — Z30.41 ENCOUNTER FOR SURVEILLANCE OF CONTRACEPTIVE PILLS: ICD-10-CM

## 2021-08-16 PROCEDURE — 99396 PREV VISIT EST AGE 40-64: CPT | Performed by: OBSTETRICS & GYNECOLOGY

## 2021-08-16 RX ORDER — NORETHINDRONE ACETATE AND ETHINYL ESTRADIOL, ETHINYL ESTRADIOL AND FERROUS FUMARATE 1MG-10(24)
1 KIT ORAL DAILY
Qty: 84 TABLET | Refills: 3 | Status: SHIPPED | OUTPATIENT
Start: 2021-08-16 | End: 2022-08-22

## 2021-08-16 RX ORDER — DULOXETINE 40 MG/1
2 CAPSULE, DELAYED RELEASE ORAL EVERY MORNING
COMMUNITY
Start: 2021-07-21

## 2021-08-16 RX ORDER — OXYBUTYNIN CHLORIDE 5 MG/1
5 TABLET ORAL 2 TIMES DAILY
Qty: 60 TABLET | Refills: 11 | Status: SHIPPED | OUTPATIENT
Start: 2021-08-16 | End: 2022-08-16

## 2021-08-16 RX ORDER — DOXYCYCLINE HYCLATE 100 MG/1
100 CAPSULE ORAL DAILY
COMMUNITY
Start: 2021-08-02 | End: 2021-08-16

## 2021-08-16 RX ORDER — BROMPHENIRAMINE MALEATE, PSEUDOEPHEDRINE HYDROCHLORIDE, AND DEXTROMETHORPHAN HYDROBROMIDE 2; 30; 10 MG/5ML; MG/5ML; MG/5ML
5 SYRUP ORAL
COMMUNITY
Start: 2021-06-29 | End: 2021-08-16

## 2021-08-16 RX ORDER — ATORVASTATIN CALCIUM 20 MG/1
20 TABLET, FILM COATED ORAL DAILY
COMMUNITY
Start: 2021-06-16 | End: 2022-06-16

## 2021-08-16 NOTE — PROGRESS NOTES
Routine Annual Visit    2021    Patient: Yessy Mchugh          MR#:[id number]      Chief Complaint   Patient presents with   • Gynecologic Exam     AE Today, Last AE 2020, Last pap 2019 (-), HPV (-), MG 2020, Colonoscopy 2019. Pt has no complaints.        History of Present Illness    51 y.o. female  who presents for annual exam.   Oxybutynin has been helping but wears off the in evening, will send BID dosing    Taking lo loestrin, but she may be perimenopausal.  Discussed coming off of the birth control for a week or so to    Had labs prediabetes and had repeated 3 mo later and labs were normal  Lost 20 lbs     Not really SA, , no pain dryness etc      No LMP recorded.  Obstetric History:  OB History        4    Para   3    Term   3            AB   1    Living   3       SAB        TAB        Ectopic   1    Molar        Multiple        Live Births   3               Menstrual History:     No LMP recorded.       ________________________________________  Patient Active Problem List   Diagnosis   • ADHD (attention deficit hyperactivity disorder)   • Depression   • Bipolar 1 disorder (CMS/HCC)       Past Medical History:   Diagnosis Date   • ADHD (attention deficit hyperactivity disorder) 3/5/2018   • Bipolar 1 disorder (CMS/HCC) 2017    Diagnosed around Feb-2017.  Depression symptoms are worse during her menses.   • Colon polyps 2019    Tubular adenoma in the sigmoid colon and traditional serrated adenoma without high-grade dysplasia or malignancy in the rectum.   • Depression    • Ectopic pregnancy    • Gestational diabetes    • History of Papanicolaou smear of cervix [id number]    No past history of abnormal Pap smears.  , , Neg Paps,  Neg HR-HPVs.   •  (vaginal birth after )        Family History   Problem Relation Age of Onset   • Diabetes Mother         Type 2- Med controlled.   • Other Mother         Total Hysterectomy.   • Cancer Maternal  "Grandmother         Lung   • Alcohol abuse Maternal Grandmother    • Colon cancer Paternal Grandmother    • Deep vein thrombosis Paternal Grandmother    • Pulmonary embolism Father    • No Known Problems Sister    • No Known Problems Daughter    • No Known Problems Son    • Multiple sclerosis Paternal Aunt    • No Known Problems Son    • Lung cancer Maternal Uncle    • Parkinsonism Paternal Uncle    • BRCA 1/2 Neg Hx    • Breast cancer Neg Hx    • Endometrial cancer Neg Hx    • Ovarian cancer Neg Hx        Past Surgical History:   Procedure Laterality Date   •  SECTION   &    • CHOLECYSTECTOMY     • COLONOSCOPY W/ BIOPSIES N/A 2019    Tubular adenoma removed from the sigmoid colon and a traditional serrated adenoma without high-grade dysplasia or cancer removed from the rectum.   • DENTAL PROCEDURE Bilateral     All 4 [place] teeth.   • POSTPARTUM TUBAL LIGATION         Social History     Tobacco Use   Smoking Status Former Smoker   • Quit date:    • Years since quittin.6   Smokeless Tobacco Never Used       has a current medication list which includes the following prescription(s): atorvastatin, duloxetine hcl, brompheniramine-pseudoephedrine-dm, doxycycline, duloxetine, norethin-eth estrad-fe biphas, oxybutynin, and risperidone.  ________________________________________      Review of Systems   Constitutional: Negative for fever and unexpected weight change.   Respiratory: Negative for shortness of breath.    Cardiovascular: Negative for chest pain.   Gastrointestinal: Negative for abdominal pain, constipation and diarrhea.   Genitourinary: Negative for frequency and urgency.   Hematological: Negative for adenopathy.   Psychiatric/Behavioral: Negative for dysphoric mood.       Objective   Physical Exam    /74   Ht 165.1 cm (65\")   Wt 83.9 kg (185 lb)   Breastfeeding No   BMI 30.79 kg/m²    BP Readings from Last 3 Encounters:   21 128/74   20 122/72 "   05/07/19 110/72      Wt Readings from Last 3 Encounters:   08/16/21 83.9 kg (185 lb)   07/08/20 89.5 kg (197 lb 6.4 oz)   05/07/19 86.6 kg (191 lb)         BMI: Body mass index is 30.79 kg/m².       General:   alert, appears stated age and cooperative   Neck: No thyromegaly or LAD, no carotid bruit noted   Heart:: regular rate and rhythm, S1, S2 normal, no murmur, click, rub or gallop   Lungs: normal respiratory effort and auscultation   Abdomen: soft, non-tender, without masses or organomegaly   Breast: inspection negative, no nipple discharge or bleeding, no masses or nodularity palpable   Urethra and bladder: urethral meatus normal; bladder nontender to palpation;   Vulva: normal, Bartholin's, Urethra, [place]'s normal   Vagina: normal mucosa, normal discharge   Cervix: multiparous appearance and no lesions   Uterus: normal size and anteverted   Adnexa: normal adnexa and no mass, fullness, tenderness       Assessment:    normal annual exam  OAB   OCP use    Plan:    Plan     [x]  Mammogram request made  []  PAP done UTD  []  Labs:   []  GC/Chl/TV  []  DEXA scan   []  Referral for colonoscopy:     Twice daily oxybutynin sent in today, if not working well then may just increase the XL to 20 mg  Discussed that she could be menopausal now, she is at the average age of menopause.  I discussed stopping the pill for about a week and checking her FSH level, but she would prefer to defer until next year.  Continue low Loestrin for now    Counseling  [x]  Nutrition  [x]  Physical activity/regular exercise   [x]  Healthy weight  []  Injury prevention  []  Smoking cessation  []  Substance misuse/abuse  [x]  Sexual behavior  []  STD prevention  []  Contraception  []  Dental health  []  Mental health  [x]  Immunization  [x]  Encouraged SBE      Patient's (Body mass index is 30.79 kg/m².) indicates that they are obese (BMI >30) with health related conditions that include none . Weight is unchanged. BMI is is above average; BMI  management plan is completed. We discussed portion control and increasing exercise.       Clemencia Horan MD  08/16/2021  13:28 EDT

## 2021-08-24 RX ORDER — OXYBUTYNIN CHLORIDE 10 MG/1
TABLET, EXTENDED RELEASE ORAL
Qty: 90 TABLET | Refills: 3 | OUTPATIENT
Start: 2021-08-24

## 2021-10-07 ENCOUNTER — HOSPITAL ENCOUNTER (OUTPATIENT)
Dept: MAMMOGRAPHY | Facility: HOSPITAL | Age: 51
Discharge: HOME | End: 2021-10-07
Admitting: OBSTETRICS & GYNECOLOGY

## 2021-10-07 DIAGNOSIS — Z12.31 ENCOUNTER FOR SCREENING MAMMOGRAM FOR MALIGNANT NEOPLASM OF BREAST: ICD-10-CM

## 2021-10-07 PROCEDURE — 77067 SCR MAMMO BI INCL CAD: CPT

## 2021-10-07 PROCEDURE — 77063 BREAST TOMOSYNTHESIS BI: CPT

## 2022-08-11 ENCOUNTER — TRANSCRIBE ORDERS (OUTPATIENT)
Dept: ADMINISTRATIVE | Facility: HOSPITAL | Age: 52
End: 2022-08-11

## 2022-08-11 DIAGNOSIS — Z12.39 SCREENING BREAST EXAMINATION: Primary | ICD-10-CM

## 2022-08-15 ENCOUNTER — AMBULATORY SURGICAL CENTER (OUTPATIENT)
Dept: URBAN - METROPOLITAN AREA SURGERY 20 | Facility: SURGERY | Age: 52
End: 2022-08-15
Payer: COMMERCIAL

## 2022-08-15 DIAGNOSIS — K64.0 FIRST DEGREE HEMORRHOIDS: ICD-10-CM

## 2022-08-15 DIAGNOSIS — Z86.010 PERSONAL HISTORY OF COLONIC POLYPS: ICD-10-CM

## 2022-08-15 DIAGNOSIS — K57.30 DIVERTICULOSIS OF LARGE INTESTINE WITHOUT PERFORATION OR ABS: ICD-10-CM

## 2022-08-15 PROCEDURE — 45378 DIAGNOSTIC COLONOSCOPY: CPT | Mod: 33 | Performed by: INTERNAL MEDICINE

## 2022-08-22 ENCOUNTER — OFFICE VISIT (OUTPATIENT)
Dept: OBSTETRICS AND GYNECOLOGY | Age: 52
End: 2022-08-22

## 2022-08-22 VITALS
SYSTOLIC BLOOD PRESSURE: 124 MMHG | WEIGHT: 195.6 LBS | DIASTOLIC BLOOD PRESSURE: 72 MMHG | HEIGHT: 65 IN | BODY MASS INDEX: 32.59 KG/M2

## 2022-08-22 DIAGNOSIS — Z11.51 SCREENING FOR HUMAN PAPILLOMAVIRUS: ICD-10-CM

## 2022-08-22 DIAGNOSIS — N95.1 PERIMENOPAUSE: ICD-10-CM

## 2022-08-22 DIAGNOSIS — Z01.419 ENCOUNTER FOR GYNECOLOGICAL EXAMINATION WITHOUT ABNORMAL FINDING: ICD-10-CM

## 2022-08-22 PROCEDURE — 99396 PREV VISIT EST AGE 40-64: CPT | Performed by: OBSTETRICS & GYNECOLOGY

## 2022-08-22 RX ORDER — OXYBUTYNIN CHLORIDE 5 MG/1
5 TABLET, EXTENDED RELEASE ORAL DAILY
Qty: 90 TABLET | Refills: 3 | Status: SHIPPED | OUTPATIENT
Start: 2022-08-22 | End: 2022-08-24

## 2022-08-22 RX ORDER — ATORVASTATIN CALCIUM 20 MG/1
1 TABLET, FILM COATED ORAL
COMMUNITY
Start: 2022-06-22

## 2022-08-22 RX ORDER — OXYBUTYNIN CHLORIDE 5 MG/1
5 TABLET, EXTENDED RELEASE ORAL DAILY
COMMUNITY
End: 2022-08-22 | Stop reason: SDUPTHER

## 2022-08-22 NOTE — PROGRESS NOTES
Routine Annual Visit    2022    Patient: Yessy Mchugh          MR#:[id number]      Chief Complaint   Patient presents with   • Gynecologic Exam     AE Today, Last AE 2021, Last pap 2019 (-), HPV (-), MG scheduled 10/12/2022, Colonoscopy 2019       History of Present Illness    52 y.o. female  who presents for annual exam.  She is doing well.  She stopped taking her low Loestrin in February.  In  she had 2 days of light bleeding, very short light period.  No urinary complaints.  Not having sex very often but both her and her  are fine with this.  Doing some exercising, but perhaps not as much as she should  Oxybutynin is controlling her urinary urgency well.    Health Maintenance  Last pap: , history abnormal: no  Mammogram:   Colonoscopy , repeat due 5 yrs  Family history of Breast, ovarian, uterine, colon, pancreatic cancer: yes - colon cancer      Patient's last menstrual period was 2022 (within days).  Obstetric History:  OB History        4    Para   3    Term   3            AB   1    Living   3       SAB        IAB        Ectopic   1    Molar        Multiple        Live Births   3               Menstrual History:     Patient's last menstrual period was 2022 (within days).       ________________________________________  Patient Active Problem List   Diagnosis   • ADHD (attention deficit hyperactivity disorder)   • Depression   • Bipolar 1 disorder (HCC)       Past Medical History:   Diagnosis Date   • ADHD (attention deficit hyperactivity disorder) 2018   • Bipolar 1 disorder (HCC) 2017    Diagnosed around Feb-2017.  Depression symptoms are worse during her menses.   • Colon polyps 2019    Tubular adenoma in the sigmoid colon and traditional serrated adenoma without high-grade dysplasia or malignancy in the rectum.   • Depression    • Ectopic pregnancy    • Gestational diabetes    • History of Papanicolaou smear of cervix  [id number]    No past history of abnormal Pap smears.  , , Neg Paps,  Neg HR-HPVs.   • Prediabetes    •  (vaginal birth after )        Family History   Problem Relation Age of Onset   • Diabetes Mother         Type 2- Med controlled.   • Other Mother         Total Hysterectomy.   • Cancer Maternal Grandmother         Lung   • Alcohol abuse Maternal Grandmother    • Colon cancer Paternal Grandmother    • Deep vein thrombosis Paternal Grandmother    • Pulmonary embolism Father    • Deep vein thrombosis Father    • No Known Problems Sister    • No Known Problems Daughter    • No Known Problems Son    • Multiple sclerosis Paternal Aunt    • No Known Problems Son    • Lung cancer Maternal Uncle    • Parkinsonism Paternal Uncle    • BRCA 1/2 Neg Hx    • Breast cancer Neg Hx    • Endometrial cancer Neg Hx    • Ovarian cancer Neg Hx        Past Surgical History:   Procedure Laterality Date   •  SECTION   &    • CHOLECYSTECTOMY     • COLONOSCOPY W/ BIOPSIES N/A 2019    Tubular adenoma removed from the sigmoid colon and a traditional serrated adenoma without high-grade dysplasia or cancer removed from the rectum.   • DENTAL PROCEDURE Bilateral     All 4 [place] teeth.   • POSTPARTUM TUBAL LIGATION         Social History     Tobacco Use   Smoking Status Former Smoker   • Quit date: 1994   • Years since quittin.6   Smokeless Tobacco Never Used       has a current medication list which includes the following prescription(s): atorvastatin, duloxetine hcl, oxybutynin xl, risperidone, and lo loestrin fe.  ________________________________________      Review of Systems   Constitutional: Negative for fever and unexpected weight change.   Respiratory: Negative for shortness of breath.    Cardiovascular: Negative for chest pain.   Gastrointestinal: Negative for abdominal pain, constipation and diarrhea.   Genitourinary: Negative for frequency and urgency.   Hematological:  "Negative for adenopathy.   Psychiatric/Behavioral: Negative for dysphoric mood.       Objective   Physical Exam    /72   Ht 165.1 cm (65\")   Wt 88.7 kg (195 lb 9.6 oz)   LMP 06/16/2022 (Within Days)   Breastfeeding No   BMI 32.55 kg/m²    BP Readings from Last 3 Encounters:   08/22/22 124/72   08/16/21 128/74   07/08/20 122/72      Wt Readings from Last 3 Encounters:   08/22/22 88.7 kg (195 lb 9.6 oz)   08/16/21 83.9 kg (185 lb)   07/08/20 89.5 kg (197 lb 6.4 oz)         BMI: Body mass index is 32.55 kg/m².       General:   alert, appears stated age and cooperative   Neck: No thyromegaly or LAD   Heart:: regular rate and rhythm, S1, S2 normal, no murmur, click, rub or gallop   Lungs: normal respiratory effort and auscultation   Abdomen: soft, non-tender, without masses or organomegaly   Breast: inspection negative, no nipple discharge or bleeding, no masses or nodularity palpable   Urethra and bladder: urethral meatus normal; bladder nontender to palpation;   Vulva: normal, Bartholin's, Urethra, [place]'s normal   Vagina: normal mucosa, normal discharge   Cervix: multiparous appearance and no lesions   Uterus: normal size and mid plane    Adnexa: normal adnexa and no mass, fullness, tenderness       Assessment:    normal annual exam   Perimenopause versus menopause    Plan:    Plan     []  Mammogram request made  [x]  PAP done  []  Labs:   []  GC/Chl/TV  []  DEXA scan   []  Referral for colonoscopy:     Plan check FSH and estradiol today to see if she may truly be in menopause.  Not sexually active very often, likely does not need contraception.  Can discontinue Loestrin    Counseling  [x]  Nutrition  [x]  Physical activity/regular exercise   [x]  Healthy weight  []  Injury prevention  []  Smoking cessation  []  Substance misuse/abuse  [x]  Sexual behavior  []  STD prevention  []  Contraception  []  Dental health  []  Mental health  []  Immunization  [x]  Encouraged SID Horan, " MD  08/22/2022  15:39 EDT

## 2022-08-23 LAB
ESTRADIOL SERPL-MCNC: 13.3 PG/ML
FSH SERPL-ACNC: 21.4 MIU/ML

## 2022-08-24 RX ORDER — OXYBUTYNIN CHLORIDE 5 MG/1
5 TABLET ORAL 2 TIMES DAILY
Qty: 180 TABLET | Refills: 3 | Status: SHIPPED | OUTPATIENT
Start: 2022-08-24 | End: 2023-08-24

## 2022-08-24 RX ORDER — OXYBUTYNIN CHLORIDE 5 MG/1
TABLET ORAL
Qty: 180 TABLET | Refills: 3 | OUTPATIENT
Start: 2022-08-24

## 2022-08-24 NOTE — TELEPHONE ENCOUNTER
I already sent in the 5 mg once daily extended release tablet    So which one is she taking?  This is the twice daily tablet

## 2022-08-26 LAB
CYTOLOGIST CVX/VAG CYTO: NORMAL
CYTOLOGY CVX/VAG DOC CYTO: NORMAL
CYTOLOGY CVX/VAG DOC THIN PREP: NORMAL
DX ICD CODE: NORMAL
HIV 1 & 2 AB SER-IMP: NORMAL
HPV I/H RISK 4 DNA CVX QL PROBE+SIG AMP: NEGATIVE
Lab: NORMAL
OTHER STN SPEC: NORMAL
STAT OF ADQ CVX/VAG CYTO-IMP: NORMAL

## 2022-08-27 NOTE — PROGRESS NOTES
Please notify that her Pap was normal and HPV negative    Clemencia Horan MD  8/27/2022  11:33 EDT

## 2022-10-12 ENCOUNTER — HOSPITAL ENCOUNTER (OUTPATIENT)
Dept: MAMMOGRAPHY | Facility: HOSPITAL | Age: 52
Discharge: HOME | End: 2022-10-12
Admitting: OBSTETRICS & GYNECOLOGY

## 2022-10-12 DIAGNOSIS — Z12.39 SCREENING BREAST EXAMINATION: ICD-10-CM

## 2022-10-12 PROCEDURE — 77063 BREAST TOMOSYNTHESIS BI: CPT

## 2022-10-12 PROCEDURE — 77067 SCR MAMMO BI INCL CAD: CPT

## 2023-08-14 RX ORDER — OXYBUTYNIN CHLORIDE 5 MG/1
TABLET ORAL
Qty: 180 TABLET | Refills: 3 | Status: SHIPPED | OUTPATIENT
Start: 2023-08-14

## 2023-09-06 ENCOUNTER — TRANSCRIBE ORDERS (OUTPATIENT)
Dept: ADMINISTRATIVE | Facility: HOSPITAL | Age: 53
End: 2023-09-06
Payer: COMMERCIAL

## 2023-09-06 DIAGNOSIS — Z12.31 BREAST CANCER SCREENING BY MAMMOGRAM: Primary | ICD-10-CM

## 2023-10-03 NOTE — TELEPHONE ENCOUNTER
rx   May also call Recovery [facility] (PACU) 24/7 @ [phone]/[facility]

## 2023-10-16 ENCOUNTER — HOSPITAL ENCOUNTER (OUTPATIENT)
Dept: MAMMOGRAPHY | Facility: HOSPITAL | Age: 53
Discharge: HOME | End: 2023-10-16
Admitting: OBSTETRICS & GYNECOLOGY
Payer: COMMERCIAL

## 2023-10-16 DIAGNOSIS — Z12.31 BREAST CANCER SCREENING BY MAMMOGRAM: ICD-10-CM

## 2023-10-16 PROCEDURE — 77063 BREAST TOMOSYNTHESIS BI: CPT

## 2023-10-16 PROCEDURE — 77067 SCR MAMMO BI INCL CAD: CPT

## 2024-01-08 ENCOUNTER — OFFICE VISIT (OUTPATIENT)
Dept: OBSTETRICS AND GYNECOLOGY | Age: 54
End: 2024-01-08
Payer: COMMERCIAL

## 2024-01-08 VITALS
SYSTOLIC BLOOD PRESSURE: 122 MMHG | DIASTOLIC BLOOD PRESSURE: 82 MMHG | BODY MASS INDEX: 35.32 KG/M2 | HEIGHT: 65 IN | WEIGHT: 212 LBS

## 2024-01-08 DIAGNOSIS — Z01.419 ENCOUNTER FOR GYNECOLOGICAL EXAMINATION: Primary | ICD-10-CM

## 2024-01-08 PROCEDURE — 99396 PREV VISIT EST AGE 40-64: CPT | Performed by: NURSE PRACTITIONER

## 2024-01-08 RX ORDER — SEMAGLUTIDE 0.68 MG/ML
0.5 INJECTION, SOLUTION SUBCUTANEOUS
COMMUNITY
Start: 2023-12-28

## 2024-01-08 NOTE — PROGRESS NOTES
Subjective       History of Present Illness    Chief Complaint   Patient presents with    Gynecologic Exam     Annual, last pap 2022 neg/hpv neg, mg 10/16/2023, csy 08/15/2022  Cc:  no problems       Yessy Mchugh is a 53 y.o. female who presents for annual exam.  Patient of   She is postmenopausal and has no complaints  No vaginal bleeding  No hot flashes  She and her  are going on a cruise next month      OB History    Para Term  AB Living   4 3 3   1 3   SAB IAB Ectopic Molar Multiple Live Births       1     3      # Outcome Date GA Lbr Carl/2nd Weight Sex Delivery Anes PTL Lv   4 Term    3374 g (7 lb 7 oz) M CS-LTranv   KIKI      Birth Comments:  by choice, with tubal.   3 Term    3572 g (7 lb 14 oz) F    KIKI   2 Term    3317 g (7 lb 5 oz) M CS-LTranv   KIKI      Birth Comments: Breech presentation.   1 Ectopic               Birth Comments: Uncertain if treated with laparoscopy and/or D&C.       The following portions of the patient's history were reviewed and updated as appropriate: allergies, current medications, past family history, past medical history, past social history, past surgical history and problem list.      Current contraception: post menopausal status  History of abnormal Pap smear: no  Perform regular self breast exam: yes - occasional  Family history of uterine or ovarian cancer: no  Family History of colon cancer: yes - PGM  Family history of breast cancer: no    Mammogram: up to date.  Colonoscopy: up to date.  DEXA: not indicated.  Last Pap: neg/neg    Social History    Tobacco Use      Smoking status: Former        Types: Cigarettes        Quit date: 1994        Years since quittin.0      Smokeless tobacco: Never    Exercise: moderately active  Calcium/Vitamin D: adequate intake    The following portions of the patient's history were reviewed and updated as appropriate: allergies, current medications, past  "family history, past medical history, past social history, past surgical history, and problem list.    Review of Systems   Constitutional: Negative.    HENT: Negative.     Eyes: Negative.    Respiratory: Negative.     Cardiovascular: Negative.    Gastrointestinal: Negative.    Endocrine: Negative.    Genitourinary: Negative.    Musculoskeletal: Negative.    Skin: Negative.    Allergic/Immunologic: Negative.    Neurological: Negative.    Hematological: Negative.    Psychiatric/Behavioral: Negative.           Objective   Physical Exam  Vitals reviewed.   Constitutional:       Appearance: She is well-developed.   Neck:      Thyroid: No thyroid mass.   Cardiovascular:      Rate and Rhythm: Normal rate and regular rhythm.      Heart sounds: Normal heart sounds.   Pulmonary:      Effort: Pulmonary effort is normal.      Breath sounds: Normal breath sounds.   Chest:   Breasts:     Right: No mass, nipple discharge, skin change or tenderness.      Left: No mass, nipple discharge, skin change or tenderness.   Abdominal:      Palpations: Abdomen is soft.      Tenderness: There is no abdominal tenderness.   Genitourinary:     Labia:         Right: No rash or lesion.         Left: No rash or lesion.       Vagina: Normal.      Cervix: No cervical motion tenderness, discharge or friability.      Adnexa:         Right: No mass or tenderness.          Left: No mass or tenderness.     Neurological:      Mental Status: She is alert and oriented to person, place, and time.   Psychiatric:         Behavior: Behavior normal.         /82   Ht 165.1 cm (65\")   Wt 96.2 kg (212 lb)   LMP 06/16/2022 (Within Days)   BMI 35.28 kg/m²     Assessment & Plan   Diagnoses and all orders for this visit:    1. Encounter for gynecological examination (Primary)  -     IGP, Apt HPV,rfx 16 / 18,45          Breast self exam technique reviewed and patient encouraged to perform self-exam monthly.  Discussed healthy lifestyle modifications.  Pap smear " done today with HPV  Recommended 30 minutes of aerobic exercise five times per week.  Discussed calcium needs to prevent osteoporosis

## 2024-11-08 ENCOUNTER — TELEPHONE (OUTPATIENT)
Dept: OBSTETRICS AND GYNECOLOGY | Age: 54
End: 2024-11-08

## 2024-11-08 NOTE — TELEPHONE ENCOUNTER
Caller: Yessy Mchugh  Female, 54 y.o., 1970  MRN: [id number]  CSN: [id number]  Phone: [phone]    Relationship to patient: SELF            Type of visit: MAMMOGRAM ONLY        Additional notes:PT WOULD LIKE TO JUAN PABLO A MAMMOGRAM

## 2024-12-31 RX ORDER — OXYBUTYNIN CHLORIDE 5 MG/1
TABLET ORAL
Qty: 180 TABLET | Refills: 3 | Status: SHIPPED | OUTPATIENT
Start: 2024-12-31

## 2025-01-13 ENCOUNTER — OFFICE VISIT (OUTPATIENT)
Dept: OBSTETRICS AND GYNECOLOGY | Age: 55
End: 2025-01-13
Payer: COMMERCIAL

## 2025-01-13 VITALS
SYSTOLIC BLOOD PRESSURE: 100 MMHG | WEIGHT: 218.2 LBS | HEIGHT: 65 IN | BODY MASS INDEX: 36.35 KG/M2 | DIASTOLIC BLOOD PRESSURE: 68 MMHG

## 2025-01-13 DIAGNOSIS — Z01.419 ENCOUNTER FOR GYNECOLOGICAL EXAMINATION: Primary | ICD-10-CM

## 2025-01-13 DIAGNOSIS — Z12.31 SCREENING MAMMOGRAM FOR BREAST CANCER: ICD-10-CM

## 2025-01-13 PROCEDURE — 99396 PREV VISIT EST AGE 40-64: CPT | Performed by: NURSE PRACTITIONER

## 2025-01-13 RX ORDER — TIRZEPATIDE 5 MG/.5ML
5 INJECTION, SOLUTION SUBCUTANEOUS
COMMUNITY
Start: 2025-01-02

## 2025-01-13 NOTE — PROGRESS NOTES
Subjective       History of Present Illness    Chief Complaint   Patient presents with    Gynecologic Exam     Ae last pap (-) Hpv (-) 24 Mg(-) 10/16/23  Scope 8/15/22  Cc: no complaints today        Yessy Mchugh is a 54 y.o. female who presents for annual exam.  Patient of   She is postmenopausal and has no complaints  No vasomotor symptoms  No bleeding    Patient reports that she is not currently experiencing any symptoms of urinary incontinence.     OB History    Para Term  AB Living   4 3 3   1 3   SAB IAB Ectopic Molar Multiple Live Births       1     3      # Outcome Date GA Lbr Carl/2nd Weight Sex Type Anes PTL Lv   4 Term    3374 g (7 lb 7 oz) M CS-LTranv   KIKI      Birth Comments:  by choice, with tubal.   3 Term    3572 g (7 lb 14 oz) F    KIKI   2 Term    3317 g (7 lb 5 oz) M CS-LTranv   KIKI      Birth Comments: Breech presentation.   1 Ectopic               Birth Comments: Uncertain if treated with laparoscopy and/or D&C.       The following portions of the patient's history were reviewed and updated as appropriate: allergies, current medications, past family history, past medical history, past social history, past surgical history and problem list.      Current contraception: post menopausal status  History of abnormal Pap smear: no  Perform regular self breast exam: yes - occasional  Family history of uterine or ovarian cancer: no  Family history of breast cancer: no    Mammogram: ordered.  Colonoscopy: up to date.  DEXA: not indicated.  Last Pap:    Social History    Tobacco Use      Smoking status: Former        Packs/day: 0.00        Types: Cigarettes        Quit date: 1994        Years since quittin.0      Smokeless tobacco: Never    Exercise: moderately active  Calcium/Vitamin D: uses supplements    The following portions of the patient's history were reviewed and updated as appropriate: allergies, current medications, past  "family history, past medical history, past social history, past surgical history, and problem list.    Review of Systems   Constitutional: Negative.    HENT: Negative.     Eyes: Negative.    Respiratory: Negative.     Cardiovascular: Negative.    Gastrointestinal: Negative.    Endocrine: Negative.    Genitourinary: Negative.    Musculoskeletal: Negative.    Skin: Negative.    Allergic/Immunologic: Negative.    Neurological: Negative.    Hematological: Negative.    Psychiatric/Behavioral: Negative.           Objective   Physical Exam  Vitals reviewed.   Constitutional:       Appearance: She is well-developed.   Neck:      Thyroid: No thyroid mass.   Cardiovascular:      Rate and Rhythm: Normal rate and regular rhythm.      Heart sounds: Normal heart sounds.   Pulmonary:      Effort: Pulmonary effort is normal.      Breath sounds: Normal breath sounds.   Chest:   Breasts:     Right: No mass, nipple discharge, skin change or tenderness.      Left: No mass, nipple discharge, skin change or tenderness.   Abdominal:      Palpations: Abdomen is soft.      Tenderness: There is no abdominal tenderness.   Genitourinary:     Labia:         Right: No rash or lesion.         Left: No rash or lesion.       Vagina: Normal.      Cervix: No cervical motion tenderness, discharge or friability.      Adnexa:         Right: No mass or tenderness.          Left: No mass or tenderness.     Neurological:      Mental Status: She is alert and oriented to person, place, and time.   Psychiatric:         Behavior: Behavior normal.         /68   Ht 165.1 cm (65\")   Wt 99 kg (218 lb 3.2 oz)   LMP 06/16/2022 (Within Days)   BMI 36.31 kg/m²     Assessment & Plan   Diagnoses and all orders for this visit:    1. Encounter for gynecological examination (Primary)    2. Screening mammogram for breast cancer  -     Mammo Screening Digital Tomosynthesis Bilateral With CAD          Breast self exam technique reviewed and patient encouraged to " perform self-exam monthly.  Discussed healthy lifestyle modifications.  Pap smear not indicated  Recommended 30 minutes of aerobic exercise five times per week.  Discussed calcium needs to prevent osteoporosis  Mammogram ordered- patient will schedule

## 2025-01-14 ENCOUNTER — HOSPITAL ENCOUNTER (OUTPATIENT)
Facility: HOSPITAL | Age: 55
Discharge: HOME | End: 2025-01-14
Admitting: NURSE PRACTITIONER
Payer: COMMERCIAL

## 2025-01-14 PROCEDURE — 77067 SCR MAMMO BI INCL CAD: CPT

## 2025-01-14 PROCEDURE — 77063 BREAST TOMOSYNTHESIS BI: CPT
